# Patient Record
(demographics unavailable — no encounter records)

---

## 2017-08-27 NOTE — ECGEPIP
Stationary ECG Study

                           [facility] - ED

                                       

                                       Test Date:    2017

Pat Name:     REMY TSANG           Department:   

Patient ID:   [id number]                 Room:         -

Gender:       M                        Technician:   rn

:          1943               Requested By: LEONEL MIRZA 

Order Number: [id number]     Reading MD:   Dimas Cerna

                                 Measurements

Intervals                              Axis          

Rate:         85                       P:            29

ME:           158                      QRS:          16

QRSD:         96                       T:            50

QT:           351                                    

QTc:          418                                    

                           Interpretive Statements

SINUS RHYTHM WITH FREQUENT VENTRICULAR PREMATURE COMPLEXES

NO PRIORS

Electronically Signed On 2017 19:20:25 EDT by Dimas Cerna

## 2017-08-27 NOTE — REPUSA
CLINICAL HISTORY:  Thoracic aneurysm.

TECHNIQUE: Multiple incremental axial, coronal, sagittal images are obtained from the thoracic inlet 
to the ischial tuberosities. Intravenous contrast material was administered for the examination as pe
r angiographic protocol.

COMMENTS:

There is no evidence for pulmonary embolism.  Ascending aorta is aneurysmal measuring up to 4.5 cm.  
Aorta is otherwise of normal caliber without evidence for dissection.

The heart is moderately enlarged.  Large pericardial effusion is present.  Effusion is exudative.  Pl
ease exclude infectious pericarditis clinically among the other possibilities.

 

Severe upper lobes predominant emphysema is seen.

 

Note is made of several nodules this includes an 11 x 7 mm nodule in the superior segment of the righ
t lower lobe abutting the major fissure.  There is an 8 x 5 mm nodule in the right middle lobe noted.


There is no evidence of pleural or parenchymal mass. There are moderate sized bilateral pleural effus
ions present.  Borderline pulmonary venous congestive changes are seen. There is no evidence of hilar
 or mediastinal lymphadenopathy. Specifically no evidence for paratracheal and supraclavicular adenop
athy. There is no evidence for a chest or abdominal wall nodule or mass.

The liver is of uniform attenuation without mass or defect. The gallbladder is not well seen possibly
 contracted. There is no intrahepatic or extrahepatic biliary ductal dilatation. The spleen is normal
. The pancreas is of normal contour and attenuation characteristics. There is no evidence of adrenal 
mass. Both kidneys demonstrate prompt and equal nephrograms. The kidneys are normal in size, shape an
d configuration. Specifically, there is no evidence of renal mass. There is no hydroureter or hydrone
phrosis.

There is diffuse descending colonic and sigmoid diverticulosis present without evidence of acute dive
rticulitis.  There is evidence of circumferential wall thickening involving duodenum and jejunum comp
atible with enteritis.  Consider consultation with GI service.  No evidence for small or large bowel 
obstruction. There is no evidence of abdominal ascites or lymphadenopathy.

The prostate gland is moderately enlarged containing calcifications producing mass effect on the blad
karson base.

The bladder is not fully distended.  The bladder wall appears thickened up to 7 mm.  Cystitis is not 
excluded. There is no pelvic ascites or lymphadenopathy.

The bony structures are free of lytic or blastic lesions.

IMPRESSION:

1.  No evidence for pulmonary embolism.

2.  .  Ascending aorta is aneurysmal measuring up to 4.5 cm

3.  The heart is moderately enlarged. 

4.  Large pericardial effusion is present.  Effusion is exudative.  Please exclude infectious pericar
ditis clinically among the other possibilities.

5.  Severe upper lobes predominant emphysema.

6.  Several nodules this includes an 11 x 7 mm nodule in the superior segment of the right lower lobe
 abutting the major fissure.  There is an 8 x 5 mm nodule in the right middle lobe noted.

7.  Diffuse descending colonic and sigmoid diverticulosis present without evidence of acute diverticu
litis. 

8.  Circumferential wall thickening involving duodenum and jejunum compatible with enteritis.  Consid
er consultation with GI service.  

9.  The prostate gland is moderately enlarged containing calcifications producing mass effect on the 
bladder base.

10.  The bladder is not fully distended.  The bladder wall appears thickened up to 7 mm.  Cystitis is
 not excluded.

 

     Electronically signed by OMER FRAGOSO MD on 08/27/2017 08:40:16 PM ET

## 2017-08-28 NOTE — REP
CHEST, THREE VIEWS:

 

HISTORY:  Chest pain.

 

The lungs are clear.  The cardiac silhouette is enlarged.  The pulmonary

vasculature is normal in appearance.  Degenerative change is present in the

thoracic spine.

 

IMPRESSION:

 

Cardiomegaly.

 

 

Signed by

Angus Sánchez MD 08/28/2017 09:28 A